# Patient Record
Sex: FEMALE | Race: WHITE | NOT HISPANIC OR LATINO | URBAN - METROPOLITAN AREA
[De-identification: names, ages, dates, MRNs, and addresses within clinical notes are randomized per-mention and may not be internally consistent; named-entity substitution may affect disease eponyms.]

---

## 2020-12-22 ENCOUNTER — OFFICE VISIT (OUTPATIENT)
Dept: ENDOCRINOLOGY | Facility: CLINIC | Age: 80
End: 2020-12-22

## 2020-12-22 VITALS
HEIGHT: 63 IN | OXYGEN SATURATION: 99 % | DIASTOLIC BLOOD PRESSURE: 60 MMHG | HEART RATE: 58 BPM | TEMPERATURE: 97.3 F | SYSTOLIC BLOOD PRESSURE: 124 MMHG | BODY MASS INDEX: 29.55 KG/M2 | WEIGHT: 166.8 LBS

## 2020-12-22 DIAGNOSIS — E11.65 UNCONTROLLED TYPE 2 DIABETES MELLITUS WITH HYPERGLYCEMIA (HCC): Primary | ICD-10-CM

## 2020-12-22 DIAGNOSIS — I10 BENIGN HYPERTENSION: ICD-10-CM

## 2020-12-22 DIAGNOSIS — I25.10 ATHEROSCLEROSIS OF NATIVE CORONARY ARTERY OF NATIVE HEART WITHOUT ANGINA PECTORIS: ICD-10-CM

## 2020-12-22 PROBLEM — E78.2 MIXED HYPERLIPIDEMIA: Status: ACTIVE | Noted: 2020-12-22

## 2020-12-22 LAB
EXPIRATION DATE: NORMAL
HBA1C MFR BLD: 7.8 %
Lab: NORMAL

## 2020-12-22 PROCEDURE — 83036 HEMOGLOBIN GLYCOSYLATED A1C: CPT | Performed by: INTERNAL MEDICINE

## 2020-12-22 PROCEDURE — 99214 OFFICE O/P EST MOD 30 MIN: CPT | Performed by: INTERNAL MEDICINE

## 2020-12-22 RX ORDER — ASPIRIN 81 MG/1
81 TABLET ORAL DAILY
COMMUNITY

## 2020-12-22 RX ORDER — EMPAGLIFLOZIN AND LINAGLIPTIN 25; 5 MG/1; MG/1
1 TABLET, FILM COATED ORAL DAILY
Start: 2020-12-22

## 2020-12-22 NOTE — PROGRESS NOTES
"     Office Note      Date: 2020  Patient Name: Martha Dunbar  MRN: [id number]  : 1940    Chief Complaint   Patient presents with   • Diabetes       History of Present Illness:   Martha Dunbar is a 80 y.o. female who presents for Diabetes type 2. Diagnosed in: . Treated in past with oral agents. Current treatments: glyxambi and metformin. Number of insulin shots per day: none. Checks blood sugar none times a day. Has low blood sugar: no. Aspirin use: Yes. Statin use: Yes. ACE-I/ARB use: Yes. Changes in health since last visit: syncope - seeing cardiologist - on monitor. Last eye exam 2019.    Subjective      Diabetic Complications:  Eyes: No  Kidneys: No  Feet: No  Heart: Yes - stent    Diet and Exercise:  Meals per day: 3  Minutes of exercise per week: 0 mins.    Review of Systems:   Review of Systems   Constitutional: Positive for fatigue.   Cardiovascular: Negative.    Gastrointestinal: Negative.    Endocrine: Negative.        The following portions of the patient's history were reviewed and updated as appropriate: allergies, current medications, past family history, past medical history, past social history, past surgical history and problem list.    Objective       Visit Vitals  /60   Pulse 58   Temp 97.3 °F (36.3 °C) (Infrared)   Ht 160 cm (63\")   Wt 75.7 kg (166 lb 12.8 oz)   SpO2 99%   BMI 29.55 kg/m²       Physical Exam:  Physical Exam  Constitutional:       Appearance: Normal appearance.   Neurological:      Mental Status: She is alert.         Labs:    HbA1c  Lab Results   Component Value Date    HGBA1C 7.8 2020       CMP  Lab Results   Component Value Date    K 4.23 2015        Lipid Panel        TSH  No results found for: TSH, FREET4     Hemoglobin A1C  Lab Results   Component Value Date    HGBA1C 7.8 2020        Microalbumin/Creatinine  No results found for: MALBCRERATIO, CREATINIURIN, MICROALBUR        Assessment / Plan      Assessment & Plan:  Problem " List Items Addressed This Visit        Cardiovascular and Mediastinum    Benign hypertension    Current Assessment & Plan     Hypertension is improving with treatment.  Continue current treatment regimen.  Blood pressure will be reassessed in 3 months.    BP okay today but BP meds have been decreased.         Relevant Medications    LISINOPRIL PO    CARVEDILOL PO    Atherosclerosis of native coronary artery of native heart without angina pectoris    Current Assessment & Plan     Continue cardiology follow up.         Relevant Medications    CARVEDILOL PO       Endocrine    Uncontrolled type 2 diabetes mellitus with hyperglycemia (CMS/[facility]) - Primary    Current Assessment & Plan     Diabetes is unchanged.   Continue current treatment regimen.  Diabetes will be reassessed in 3 months.    A1c acceptable.  She has been caring for her brother who recently passed away. She hopes to be able to exercise more now.           Relevant Medications    metFORMIN (GLUCOPHAGE) 1000 MG tablet    Empagliflozin-linaGLIPtin (Glyxambi) 25-5 MG tablet    Other Relevant Orders    POC Glycosylated Hemoglobin (Hb A1C) (Completed)    Microalbumin / Creatinine Urine Ratio - Urine, Clean Catch           Return in about 3 months (around 3/22/2021) for Recheck with A1c.    Abel Lee MD   12/22/2020

## 2020-12-22 NOTE — ASSESSMENT & PLAN NOTE
Diabetes is unchanged.   Continue current treatment regimen.  Diabetes will be reassessed in 3 months.    A1c acceptable.  She has been caring for her brother who recently passed away. She hopes to be able to exercise more now.

## 2020-12-22 NOTE — ASSESSMENT & PLAN NOTE
Hypertension is improving with treatment.  Continue current treatment regimen.  Blood pressure will be reassessed in 3 months.    BP okay today but BP meds have been decreased.

## 2020-12-23 LAB
ALBUMIN/CREAT UR: <5 MG/G CREAT (ref 0–29)
CREAT UR-MCNC: 64.9 MG/DL
MICROALBUMIN UR-MCNC: <3 UG/ML

## 2021-04-01 ENCOUNTER — OFFICE VISIT (OUTPATIENT)
Dept: ENDOCRINOLOGY | Facility: CLINIC | Age: 81
End: 2021-04-01

## 2021-04-01 VITALS
SYSTOLIC BLOOD PRESSURE: 122 MMHG | WEIGHT: 168 LBS | HEIGHT: 63 IN | TEMPERATURE: 97.3 F | BODY MASS INDEX: 29.77 KG/M2 | OXYGEN SATURATION: 97 % | DIASTOLIC BLOOD PRESSURE: 62 MMHG | HEART RATE: 72 BPM

## 2021-04-01 DIAGNOSIS — I25.10 ATHEROSCLEROSIS OF NATIVE CORONARY ARTERY OF NATIVE HEART WITHOUT ANGINA PECTORIS: ICD-10-CM

## 2021-04-01 DIAGNOSIS — E11.65 UNCONTROLLED TYPE 2 DIABETES MELLITUS WITH HYPERGLYCEMIA (HCC): Primary | ICD-10-CM

## 2021-04-01 DIAGNOSIS — E78.2 MIXED HYPERLIPIDEMIA: ICD-10-CM

## 2021-04-01 DIAGNOSIS — I10 BENIGN HYPERTENSION: ICD-10-CM

## 2021-04-01 LAB
EXPIRATION DATE: NORMAL
HBA1C MFR BLD: 8.1 %
Lab: NORMAL

## 2021-04-01 PROCEDURE — 83036 HEMOGLOBIN GLYCOSYLATED A1C: CPT | Performed by: INTERNAL MEDICINE

## 2021-04-01 PROCEDURE — 99214 OFFICE O/P EST MOD 30 MIN: CPT | Performed by: INTERNAL MEDICINE

## 2021-04-01 RX ORDER — ATORVASTATIN CALCIUM 40 MG/1
TABLET, FILM COATED ORAL DAILY
COMMUNITY
Start: 2021-03-29

## 2021-04-01 RX ORDER — CARVEDILOL 6.25 MG/1
TABLET ORAL 2 TIMES DAILY
COMMUNITY
Start: 2021-02-08

## 2021-04-01 RX ORDER — LISINOPRIL 10 MG/1
TABLET ORAL DAILY
COMMUNITY
Start: 2021-03-29

## 2021-04-01 RX ORDER — CLOPIDOGREL BISULFATE 75 MG/1
TABLET ORAL DAILY
COMMUNITY
Start: 2021-02-19

## 2021-04-01 RX ORDER — CLOBETASOL PROPIONATE 0.5 MG/G
CREAM TOPICAL AS NEEDED
COMMUNITY
Start: 2021-03-29

## 2021-04-01 RX ORDER — EZETIMIBE 10 MG/1
TABLET ORAL DAILY
COMMUNITY
Start: 2021-03-29

## 2021-04-01 NOTE — PROGRESS NOTES
"     Office Note      Date: 2021  Patient Name: Martha Dunbar  MRN: [id number]  : 1940    Chief Complaint   Patient presents with   • Follow-up       History of Present Illness:   Martha Dunbar is a 80 y.o. female who presents for Diabetes type 2. Diagnosed in: . Treated in past with oral agents. Current treatments: glyxambi and metformin. Number of insulin shots per day: none. Checks blood sugar one time a week. Has low blood sugar: no. Aspirin use: Yes. Statin use: Yes. ACE-I/ARB use: Yes. Changes in health since last visit: none. Last eye exam .    Subjective      Diabetic Complications:  Eyes: No  Kidneys: No  Feet: No  Heart: Yes - stent    Diet and Exercise:  Meals per day: 3  Minutes of exercise per week: 0 mins.    Review of Systems:   Review of Systems   Constitutional: Negative.    Cardiovascular: Negative.    Gastrointestinal: Negative.    Endocrine: Negative.        The following portions of the patient's history were reviewed and updated as appropriate: allergies, current medications, past family history, past medical history, past social history, past surgical history and problem list.    Objective       Visit Vitals  /62   Pulse 72   Temp 97.3 °F (36.3 °C) (Infrared)   Ht 160 cm (63\")   Wt 76.2 kg (168 lb)   SpO2 97%   BMI 29.76 kg/m²       Physical Exam:  Physical Exam  Constitutional:       Appearance: Normal appearance.   Neurological:      Mental Status: She is alert.         Labs:    HbA1c  Lab Results   Component Value Date    HGBA1C 8.1 2021       CMP  Lab Results   Component Value Date    K 4.23 2015        Lipid Panel        TSH  No results found for: TSH, FREET4     Hemoglobin A1C  Lab Results   Component Value Date    HGBA1C 8.1 2021        Microalbumin/Creatinine  Lab Results   Component Value Date    MALBCRERATIO <5 2020    CREATINIURIN 64.9 2020    MICROALBUR <3.0 2020           Assessment / Plan      Assessment & " Plan:  Diagnoses and all orders for this visit:    1. Uncontrolled type 2 diabetes mellitus with hyperglycemia (CMS/[facility]) (Primary)  Assessment & Plan:  Diabetes is worsening.   Continue current treatment regimen.  Diabetes will be reassessed in 3 months.    She started back to the gym about 6 weeks ago.  Work on diet/exercise for now.    Orders:  -     POC Glycosylated Hemoglobin (Hb A1C)    2. Benign hypertension  Assessment & Plan:  Hypertension is unchanged.  Continue current treatment regimen.  Blood pressure will be reassessed in 3 months.      3. Mixed hyperlipidemia  Assessment & Plan:  Continue statin and zetia.      4. Atherosclerosis of native coronary artery of native heart without angina pectoris  Assessment & Plan:  Continue ASA, statin and SGLT-2 inhibitor.        Return in about 3 months (around 7/1/2021) for Recheck with A1c.    Abel Lee MD   04/01/2021

## 2021-04-01 NOTE — ASSESSMENT & PLAN NOTE
Diabetes is worsening.   Continue current treatment regimen.  Diabetes will be reassessed in 3 months.    She started back to the gym about 6 weeks ago.  Work on diet/exercise for now.

## 2021-09-10 ENCOUNTER — MEDICATION THERAPY MANAGEMENT (OUTPATIENT)
Dept: ENDOCRINOLOGY | Facility: CLINIC | Age: 81
End: 2021-09-10

## 2021-09-10 ENCOUNTER — OFFICE VISIT (OUTPATIENT)
Dept: ENDOCRINOLOGY | Facility: CLINIC | Age: 81
End: 2021-09-10

## 2021-09-10 VITALS
HEART RATE: 57 BPM | WEIGHT: 165 LBS | SYSTOLIC BLOOD PRESSURE: 130 MMHG | DIASTOLIC BLOOD PRESSURE: 60 MMHG | BODY MASS INDEX: 29.23 KG/M2 | HEIGHT: 63 IN | OXYGEN SATURATION: 100 %

## 2021-09-10 DIAGNOSIS — E78.2 MIXED HYPERLIPIDEMIA: ICD-10-CM

## 2021-09-10 DIAGNOSIS — E11.65 UNCONTROLLED TYPE 2 DIABETES MELLITUS WITH HYPERGLYCEMIA (HCC): Primary | ICD-10-CM

## 2021-09-10 DIAGNOSIS — I10 BENIGN HYPERTENSION: ICD-10-CM

## 2021-09-10 DIAGNOSIS — I25.10 ATHEROSCLEROSIS OF NATIVE CORONARY ARTERY OF NATIVE HEART WITHOUT ANGINA PECTORIS: ICD-10-CM

## 2021-09-10 LAB
GLUCOSE BLD-MCNC: 180 MG/DL
HBA1C MFR BLD: 8.5 %

## 2021-09-10 PROCEDURE — 99214 OFFICE O/P EST MOD 30 MIN: CPT | Performed by: INTERNAL MEDICINE

## 2021-09-10 RX ORDER — FLUOCINOLONE ACETONIDE 0.25 MG/G
CREAM TOPICAL EVERY 12 HOURS SCHEDULED
COMMUNITY

## 2021-09-10 NOTE — PROGRESS NOTES
MTM-[facility] Pharmacy Visit [facility]    Martha Dunbar is a 81 y.o. female with T2DM seen by Endocrinology and assessed by the Medication Management Clinic Pharmacist at [facility]. This was an Initial MTM visit for Martha Dunbar.    Martha Dunbar was introduced to the [facility] and allergies, PMH, and medications were reviewed.    Insurance Coverage/Eligibility:  • [employer]  • Patient is Eligible for [facility]    Past Medical History:   Diagnosis Date   • Arthritis    • Benign essential hypertension    • CAD (coronary artery disease)    • Coronary arteriosclerosis    • Dependent edema    • Disorder associated with type 2 diabetes mellitus (CMS/HCC)    • Mixed hyperlipidemia    • Overweight (BMI 25.0-29.9)      Social History     Socioeconomic History   • Marital status: Unknown     Spouse name: Not on file   • Number of children: Not on file   • Years of education: Not on file   • Highest education level: Not on file   Tobacco Use   • Smoking status: Never Smoker   • Smokeless tobacco: Never Used   Vaping Use   • Vaping Use: Never used   Substance and Sexual Activity   • Alcohol use: Yes     Alcohol/week: 1.0 standard drinks     Types: 1 Glasses of wine per week   • Drug use: Never   • Sexual activity: Defer     Medication Allergies:  Darvon [propoxyphene], Floxacillin [flucloxacillin], Penicillins, Sulfa antibiotics, and Victoza [liraglutide]    Current Medication List:    Current Outpatient Medications:   •  aspirin 81 MG EC tablet, Take 81 mg by mouth Daily., Disp: , Rfl:   •  atorvastatin (LIPITOR) 40 MG tablet, Daily., Disp: , Rfl:   •  carvedilol (COREG) 6.25 MG tablet, 2 (two) times a day., Disp: , Rfl:   •  clobetasol (TEMOVATE) 0.05 % cream, As Needed., Disp: , Rfl:   •  clopidogrel (PLAVIX) 75 MG tablet, Daily., Disp: , Rfl:   •  Empagliflozin-linaGLIPtin (Glyxambi) 25-5 MG tablet, Take 1 each by mouth  Daily., Disp: , Rfl:   •  ezetimibe (ZETIA) 10 MG tablet, Daily., Disp: , Rfl:   •  fluocinolone (SYNALAR) 0.025 % cream, Apply  topically to the appropriate area as directed Every 12 (Twelve) Hours., Disp: , Rfl:   •  lisinopril (PRINIVIL,ZESTRIL) 10 MG tablet, Daily., Disp: , Rfl:   •  metFORMIN (GLUCOPHAGE) 1000 MG tablet, Take 1,000 mg by mouth 2 (two) times a day., Disp: , Rfl:     Vitals/Labs:  BP: (130)/(60) 130/60   Heart Rate:  [57] 57      There were no vitals filed for this visit.  Lab Results   Component Value Date    HGBA1C 8.1 04/01/2021     Lab Results   Component Value Date    K 4.23 09/16/2015     No results found for: CHOL, CHLPL, TRIG, HDL, LDL, LDLDIRECT  Microalbumin    Microalbumin 12/22/20   Microalbumin, Urine <3.0            Drug-Drug Interactions:   • Clopidogrel + ASA: Concurrent use increases risk of bleeding. Managed by patient's cardiologist. Endorses bruising but denies any other s/sx of bleeding. No further action warranted at this time.     Medication Assessment:   1. Aspirin - Currently Taking  2. Statin - Currently Taking  3. ACEi/ARB - Currently Taking    Medication Education on Specialty Medication:  The patient was provided medication education via verbal and written information on new and/or current target medications. Patient expressed understanding and had no further questions at this time.    Glyxambi (empagliflozin/linagliptin)   · Discussed the medication's MOA of increasing insulin synthesis, decreasing hepatic glucose production, and increased urinary excretion of glucose    · Take one tablet daily in the morning with or without food  · May cause more frequent urination especially upon starting the medication. Also discussed there may be an increased incidence of UTIs or yeast infections and to monitor for s/sx such as itching, pain/burning on urination, odor, and discharge.   • Encouraged to drink plenty of water as to not get dehydrated especially in warmer weather or  "with activity      Assessment & Plan:  1. Provider Changes This Visit: None  2. Ms. Dunbar endorsed some \"stomach upset to PharmD, unsure if related to current medication therapy. States she has an upcoming GI scope and prefers to wait until she has scope results to change any medication therapy. Encouraged pt to inform clinic of results should they impact DM medication regimen.   3. Therapy Modifications Recommended: None at This Time   4. Provided contact information for the pharmacy team and discussed [facility] services available to patient, including: monitoring of refills, prior authorizations, and copay coupon cards, as applicable, as well as curb-side pick-up or mail-order as needed.   5. Ms. Dunbar currently filling prescriptions with [facility] in [place]. States her Glyxambi frequently has to be ordered when she requests refill but otherwise no issues acquiring medications. Discussed anticipated copays through  Retail Pharm[facility], similar to patient's current pharmacy prices. As a result, pt not interested in converting at this time.   6. PharmD Follow Up: Plan to f/u with pt at next provider appointment or sooner if contacted by patient.      Juliet Hull, [place]  9/10/2021  12:49 EDT    "

## 2021-09-10 NOTE — PROGRESS NOTES
"     Office Note      Date: 09/10/2021  Patient Name: Martha Dunbar  MRN: [id number]  : 1940    Chief Complaint   Patient presents with   • Diabetes       History of Present Illness:   Martha Dunbar is a 81 y.o. female who presents for Diabetes type 2. Diagnosed in: . Treated in past with oral agents. Current treatments: glyxambi and metformin. Number of insulin shots per day: none. Checks blood sugar one time a week. Has low blood sugar: no. Aspirin use: Yes. Statin use: Yes. ACE-I/ARB use: Yes. Changes in health since last visit: none. Last eye exam 10/2020.    Subjective      Diabetic Complications:  Eyes: No  Kidneys: No  Feet: No  Heart: Yes - stent    Diet and Exercise:  Meals per day: 3  Minutes of exercise per week: 0 mins.    Review of Systems:   Review of Systems   Constitutional: Negative.    Cardiovascular: Negative.    Gastrointestinal: Negative.    Endocrine: Negative.        The following portions of the patient's history were reviewed and updated as appropriate: allergies, current medications, past family history, past medical history, past social history, past surgical history and problem list.    Objective       Visit Vitals  /60 (BP Location: Left arm, Patient Position: Sitting, Cuff Size: Adult)   Pulse 57   Ht 160 cm (63\")   Wt 74.8 kg (165 lb)   SpO2 100%   BMI 29.23 kg/m²       Physical Exam:  Physical Exam  Constitutional:       Appearance: Normal appearance.   Cardiovascular:      Pulses:           Dorsalis pedis pulses are 2+ on the right side and 2+ on the left side.        Posterior tibial pulses are 2+ on the right side and 2+ on the left side.   Musculoskeletal:      Right foot: Deformity present.      Left foot: Deformity present.   Feet:      Right foot:      Protective Sensation: 5 sites tested. 5 sites sensed.      Skin integrity: Skin integrity normal.      Toenail Condition: Right toenails are normal.      Left foot:      Protective Sensation: 5 sites tested. " 5 sites sensed.      Skin integrity: Skin integrity normal.      Toenail Condition: Left toenails are normal.      Comments: Hammer toes - 2nd toes bilaterally  Neurological:      Mental Status: She is alert.         Labs:    HbA1c  Lab Results   Component Value Date    HGBA1C 8.1 04/01/2021       CMP  Lab Results   Component Value Date    K 4.23 09/16/2015        Lipid Panel        TSH  No results found for: TSH, FREET4     Hemoglobin A1C  Lab Results   Component Value Date    HGBA1C 8.1 04/01/2021        Microalbumin/Creatinine  Lab Results   Component Value Date    MALBCRERATIO <5 12/22/2020    CREATINIURIN 64.9 12/22/2020    MICROALBUR <3.0 12/22/2020           Assessment / Plan      Assessment & Plan:  Diagnoses and all orders for this visit:    1. Uncontrolled type 2 diabetes mellitus with hyperglycemia (CMS/[facility]) (Primary)  Assessment & Plan:  Trying to track down recent labs.  Continue glyxambi and metformin.      2. Benign hypertension  Assessment & Plan:  Hypertension is unchanged.  Continue current treatment regimen.  Blood pressure will be reassessed at the next regular appointment.      3. Mixed hyperlipidemia  Assessment & Plan:  Continue statin and ezetimibe.      4. Atherosclerosis of native coronary artery of native heart without angina pectoris  Assessment & Plan:  Continue ASA, statin, and SGLT-2 inhibitor.        Return in about 3 months (around 12/10/2021) for Recheck with A1c, microalbumin.    Abel Lee MD   09/10/2021

## 2021-12-20 ENCOUNTER — DOCUMENTATION (OUTPATIENT)
Dept: ENDOCRINOLOGY | Facility: CLINIC | Age: 81
End: 2021-12-20

## 2021-12-20 NOTE — PROGRESS NOTES
Patient declined filling specialty medication at [facility] and/or enrollment in the Patient Management Program.    [employer]to[employer]- $60/ 90 days  Patient loyal to their pharmacy    Denae Lundy CPhT  12/20/2021  15:29 EST

## 2022-01-24 ENCOUNTER — LAB (OUTPATIENT)
Dept: LAB | Facility: HOSPITAL | Age: 82
End: 2022-01-24

## 2022-01-24 ENCOUNTER — OFFICE VISIT (OUTPATIENT)
Dept: ENDOCRINOLOGY | Facility: CLINIC | Age: 82
End: 2022-01-24

## 2022-01-24 VITALS
BODY MASS INDEX: 29.41 KG/M2 | HEIGHT: 63 IN | HEART RATE: 60 BPM | SYSTOLIC BLOOD PRESSURE: 122 MMHG | DIASTOLIC BLOOD PRESSURE: 66 MMHG | WEIGHT: 166 LBS | OXYGEN SATURATION: 97 %

## 2022-01-24 DIAGNOSIS — E11.65 UNCONTROLLED TYPE 2 DIABETES MELLITUS WITH HYPERGLYCEMIA: ICD-10-CM

## 2022-01-24 DIAGNOSIS — E78.2 MIXED HYPERLIPIDEMIA: ICD-10-CM

## 2022-01-24 DIAGNOSIS — I10 BENIGN HYPERTENSION: ICD-10-CM

## 2022-01-24 DIAGNOSIS — I25.10 ATHEROSCLEROSIS OF NATIVE CORONARY ARTERY OF NATIVE HEART WITHOUT ANGINA PECTORIS: ICD-10-CM

## 2022-01-24 DIAGNOSIS — E11.65 UNCONTROLLED TYPE 2 DIABETES MELLITUS WITH HYPERGLYCEMIA: Primary | ICD-10-CM

## 2022-01-24 LAB
EXPIRATION DATE: ABNORMAL
GLUCOSE BLDC GLUCOMTR-MCNC: 159 MG/DL (ref 70–130)
HBA1C MFR BLD: 8.3 %
Lab: ABNORMAL

## 2022-01-24 PROCEDURE — 99214 OFFICE O/P EST MOD 30 MIN: CPT | Performed by: INTERNAL MEDICINE

## 2022-01-24 PROCEDURE — 82570 ASSAY OF URINE CREATININE: CPT

## 2022-01-24 PROCEDURE — 82043 UR ALBUMIN QUANTITATIVE: CPT

## 2022-01-24 PROCEDURE — 82947 ASSAY GLUCOSE BLOOD QUANT: CPT | Performed by: INTERNAL MEDICINE

## 2022-01-24 NOTE — PROGRESS NOTES
"     Office Note      Date: 2022  Patient Name: Martha Dunbar  MRN: [id number]  : 1940    Chief Complaint   Patient presents with   • Diabetes     type I       History of Present Illness:   Martha Dunbar is a 81 y.o. female who presents for Diabetes type 2. Diagnosed in: . Treated in past with oral agents. Current treatments: glyxambi and metformin. Number of insulin shots per day: none. Checks blood sugar one time a week. Has low blood sugar: no. Aspirin use: Yes. Statin use: Yes. ACE-I/ARB use: Yes. Changes in health since last visit: none. Last eye exam 10/2020.    Subjective      Diabetic Complications:  Eyes: No  Kidneys: No  Feet: No  Heart: Yes - stent    Diet and Exercise:  Meals per day: 3  Minutes of exercise per week: 0 mins.    Review of Systems:   Review of Systems   Constitutional: Negative.    Cardiovascular: Negative.    Gastrointestinal: Negative.    Endocrine: Negative.        The following portions of the patient's history were reviewed and updated as appropriate: allergies, current medications, past family history, past medical history, past social history, past surgical history and problem list.    Objective       Visit Vitals  /66 (BP Location: Right arm, Patient Position: Sitting, Cuff Size: Adult)   Pulse 60   Ht 160 cm (63\")   Wt 75.3 kg (166 lb)   SpO2 97%   BMI 29.41 kg/m²       Physical Exam:  Physical Exam  Constitutional:       Appearance: Normal appearance.   Neurological:      Mental Status: She is alert.         Labs:    HbA1c  Lab Results   Component Value Date    HGBA1C 8.3 12/15/2021       CMP  Lab Results   Component Value Date    GLUCOSE 180 2021    K 4.23 2015        Lipid Panel        TSH  No results found for: TSH, FREET4     Hemoglobin A1C  Lab Results   Component Value Date    HGBA1C 8.3 12/15/2021        Microalbumin/Creatinine  Lab Results   Component Value Date    MALBCRERATIO <5 2020    CREATINIURIN 64.9 2020    " MICROALBUR <3.0 12/22/2020           Assessment / Plan      Assessment & Plan:  Diagnoses and all orders for this visit:    1. Uncontrolled type 2 diabetes mellitus with hyperglycemia (HCC) (Primary)  Assessment & Plan:  Diabetes is improving with treatment.  A1c slightly better at 8.3%.  Continue current treatment regimen.  She plans to work more on diet/exercise.  Heading to [place] soon and plans to be more active.  Diabetes will be reassessed in 3 months.    Orders:  -     POC Glucose, Blood  -     Cancel: POC Glycosylated Hemoglobin (Hb A1C)  -     Microalbumin / Creatinine Urine Ratio - Urine, Clean Catch; Future    2. Benign hypertension  Assessment & Plan:  Hypertension is unchanged.  Continue current treatment regimen.  Blood pressure will be reassessed at the next regular appointment.      3. Mixed hyperlipidemia  Assessment & Plan:  Continue statin.      4. Atherosclerosis of native coronary artery of native heart without angina pectoris  Assessment & Plan:  Continue ASA, statin and SGLT-2 inhibitor.        Return in about 3 months (around 4/24/2022) for Recheck with A1c, CMP.    Abel Lee MD   01/24/2022

## 2022-01-24 NOTE — ASSESSMENT & PLAN NOTE
Diabetes is improving with treatment.  A1c slightly better at 8.3%.  Continue current treatment regimen.  She plans to work more on diet/exercise.  Heading to [place] soon and plans to be more active.  Diabetes will be reassessed in 3 months.

## 2022-01-25 LAB
ALBUMIN UR-MCNC: 4.5 MG/DL
CREAT UR-MCNC: 62.9 MG/DL
MICROALBUMIN/CREAT UR: 71.5 MG/G

## 2022-05-17 ENCOUNTER — OFFICE VISIT (OUTPATIENT)
Dept: ENDOCRINOLOGY | Facility: CLINIC | Age: 82
End: 2022-05-17

## 2022-05-17 ENCOUNTER — LAB (OUTPATIENT)
Dept: LAB | Facility: HOSPITAL | Age: 82
End: 2022-05-17

## 2022-05-17 VITALS
HEIGHT: 63 IN | DIASTOLIC BLOOD PRESSURE: 64 MMHG | SYSTOLIC BLOOD PRESSURE: 130 MMHG | OXYGEN SATURATION: 96 % | WEIGHT: 168 LBS | BODY MASS INDEX: 29.77 KG/M2 | HEART RATE: 62 BPM

## 2022-05-17 DIAGNOSIS — E11.65 UNCONTROLLED TYPE 2 DIABETES MELLITUS WITH HYPERGLYCEMIA: Primary | ICD-10-CM

## 2022-05-17 DIAGNOSIS — E11.65 UNCONTROLLED TYPE 2 DIABETES MELLITUS WITH HYPERGLYCEMIA: ICD-10-CM

## 2022-05-17 DIAGNOSIS — I10 BENIGN HYPERTENSION: ICD-10-CM

## 2022-05-17 DIAGNOSIS — E78.2 MIXED HYPERLIPIDEMIA: ICD-10-CM

## 2022-05-17 DIAGNOSIS — I25.10 ATHEROSCLEROSIS OF NATIVE CORONARY ARTERY OF NATIVE HEART WITHOUT ANGINA PECTORIS: ICD-10-CM

## 2022-05-17 LAB
EXPIRATION DATE: ABNORMAL
EXPIRATION DATE: NORMAL
GLUCOSE BLDC GLUCOMTR-MCNC: 146 MG/DL (ref 70–130)
HBA1C MFR BLD: 7.7 %
Lab: ABNORMAL
Lab: NORMAL

## 2022-05-17 PROCEDURE — 83036 HEMOGLOBIN GLYCOSYLATED A1C: CPT | Performed by: INTERNAL MEDICINE

## 2022-05-17 PROCEDURE — 82947 ASSAY GLUCOSE BLOOD QUANT: CPT | Performed by: INTERNAL MEDICINE

## 2022-05-17 PROCEDURE — 99214 OFFICE O/P EST MOD 30 MIN: CPT | Performed by: INTERNAL MEDICINE

## 2022-05-17 PROCEDURE — 3051F HG A1C>EQUAL 7.0%<8.0%: CPT | Performed by: INTERNAL MEDICINE

## 2022-05-17 NOTE — PROGRESS NOTES
"     Office Note      Date: 2022  Patient Name: Martha Dunbar  MRN: [id number]  : 1940    Chief Complaint   Patient presents with   • Diabetes     Type II   • Hyperlipidemia   • Hypertension       History of Present Illness:   Martha Dunbar is a 82 y.o. female who presents for Diabetes type 2. Diagnosed in: . Treated in past with oral agents. Current treatments: glyxambi and metformin. Number of insulin shots per day: none. Checks blood sugar one time a week. Has low blood sugar: no. Aspirin use: Yes. Statin use: Yes. ACE-I/ARB use: Yes. Changes in health since last visit: none. Last eye exam 2022.    She thinks she might have a UTI.    She had gastric emptying study done 2 months ago.  This did show some delayed gastric emptying.      Subjective      Diabetic Complications:  Eyes: No  Kidneys: No  Feet: No  Heart: Yes - stent    Diet and Exercise:  Meals per day: 3  Minutes of exercise per week: 0 mins.    Review of Systems:   Review of Systems   Constitutional: Negative.    Cardiovascular: Negative.    Gastrointestinal: Negative.    Endocrine: Negative.        The following portions of the patient's history were reviewed and updated as appropriate: allergies, current medications, past family history, past medical history, past social history, past surgical history and problem list.    Objective       Visit Vitals  /64 (BP Location: Left arm, Patient Position: Sitting, Cuff Size: Adult)   Pulse 62   Ht 160 cm (63\")   Wt 76.2 kg (168 lb)   SpO2 96%   BMI 29.76 kg/m²       Physical Exam:  Physical Exam  Constitutional:       Appearance: Normal appearance.   Neurological:      Mental Status: She is alert.         Labs:    HbA1c  Lab Results   Component Value Date    HGBA1C 7.7 2022       CMP  Lab Results   Component Value Date    GLUCOSE 180 2021    K 4.23 2015        Lipid Panel        TSH  No results found for: TSH, FREET4     Hemoglobin A1C  Lab Results   Component " Value Date    HGBA1C 7.7 05/17/2022        Microalbumin/Creatinine  Lab Results   Component Value Date    MALBCRERATIO 71.5 01/24/2022    CREATINIURIN 64.9 12/22/2020    MICROALBUR 4.5 01/24/2022           Assessment / Plan      Assessment & Plan:  Diagnoses and all orders for this visit:    1. Uncontrolled type 2 diabetes mellitus with hyperglycemia (HCC) (Primary)  Assessment & Plan:  Diabetes is improving with lifestyle modifications.   Continue current treatment regimen.  Diabetes will be reassessed in 3 months.    Check u/a today.  She thinks she might have UTI.  She has some abx allergies but has tolerated cipro in the past.      Orders:  -     POC Glucose, Blood  -     POC Glycosylated Hemoglobin (Hb A1C)  -     Comprehensive Metabolic Panel; Future  -     Urinalysis With Culture If Indicated -; Future    2. Benign hypertension  Assessment & Plan:  Hypertension is unchanged.  Continue current treatment regimen.  Blood pressure will be reassessed at the next regular appointment.      3. Mixed hyperlipidemia  Assessment & Plan:  Continue statin.      4. Atherosclerosis of native coronary artery of native heart without angina pectoris  Assessment & Plan:  Continue ASA, statin, ezetimibe and SGLT-2 inhibitor.        Return in about 3 months (around 8/17/2022) for Recheck with A1c, CMP, lipids, TSH, microalbumin, foot exam.    Abel Lee MD   05/17/2022

## 2022-05-17 NOTE — ASSESSMENT & PLAN NOTE
Diabetes is improving with lifestyle modifications.   Continue current treatment regimen.  Diabetes will be reassessed in 3 months.    Check u/a today.  She thinks she might have UTI.  She has some abx allergies but has tolerated cipro in the past.

## 2022-05-18 LAB
ALBUMIN SERPL-MCNC: 3.7 G/DL (ref 3.5–5.2)
ALBUMIN/GLOB SERPL: 1.5 G/DL
ALP SERPL-CCNC: 73 U/L (ref 39–117)
ALT SERPL-CCNC: 24 U/L (ref 1–33)
AST SERPL-CCNC: 25 U/L (ref 1–32)
BILIRUB SERPL-MCNC: 0.4 MG/DL (ref 0–1.2)
BUN SERPL-MCNC: 12 MG/DL (ref 8–23)
BUN/CREAT SERPL: 12.9 (ref 7–25)
CALCIUM SERPL-MCNC: 9.6 MG/DL (ref 8.6–10.5)
CHLORIDE SERPL-SCNC: 105 MMOL/L (ref 98–107)
CO2 SERPL-SCNC: 21.2 MMOL/L (ref 22–29)
CREAT SERPL-MCNC: 0.93 MG/DL (ref 0.57–1)
EGFRCR SERPLBLD CKD-EPI 2021: 61.5 ML/MIN/1.73
GLOBULIN SER CALC-MCNC: 2.5 GM/DL
GLUCOSE SERPL-MCNC: 121 MG/DL (ref 65–99)
POTASSIUM SERPL-SCNC: 4.9 MMOL/L (ref 3.5–5.2)
PROT SERPL-MCNC: 6.2 G/DL (ref 6–8.5)
SODIUM SERPL-SCNC: 141 MMOL/L (ref 136–145)

## 2022-05-21 LAB
APPEARANCE UR: CLEAR
BACTERIA #/AREA URNS HPF: ABNORMAL /[HPF]
BACTERIA UR CULT: ABNORMAL
BILIRUB UR QL STRIP: NEGATIVE
CASTS URNS QL MICRO: ABNORMAL /LPF
COLOR UR: YELLOW
EPI CELLS #/AREA URNS HPF: ABNORMAL /HPF (ref 0–10)
GLUCOSE UR QL STRIP: ABNORMAL
HGB UR QL STRIP: NEGATIVE
KETONES UR QL STRIP: NEGATIVE
LEUKOCYTE ESTERASE UR QL STRIP: ABNORMAL
MICRO URNS: ABNORMAL
NITRITE UR QL STRIP: NEGATIVE
OTHER ANTIBIOTIC SUSC ISLT: ABNORMAL
PH UR STRIP: 5 [PH] (ref 5–7.5)
PROT UR QL STRIP: NEGATIVE
RBC #/AREA URNS HPF: ABNORMAL /HPF (ref 0–2)
SP GR UR STRIP: >=1.03 (ref 1–1.03)
URINALYSIS REFLEX: ABNORMAL
UROBILINOGEN UR STRIP-MCNC: 0.2 MG/DL (ref 0.2–1)
WBC #/AREA URNS HPF: ABNORMAL /HPF (ref 0–5)

## 2022-05-22 RX ORDER — CIPROFLOXACIN 500 MG/1
500 TABLET, FILM COATED ORAL 2 TIMES DAILY
Qty: 14 TABLET | Refills: 0 | Status: SHIPPED | OUTPATIENT
Start: 2022-05-22 | End: 2023-01-24

## 2023-01-24 ENCOUNTER — OFFICE VISIT (OUTPATIENT)
Dept: ENDOCRINOLOGY | Facility: CLINIC | Age: 83
End: 2023-01-24
Payer: MEDICARE

## 2023-01-24 VITALS
BODY MASS INDEX: 28.53 KG/M2 | DIASTOLIC BLOOD PRESSURE: 68 MMHG | OXYGEN SATURATION: 96 % | HEIGHT: 63 IN | SYSTOLIC BLOOD PRESSURE: 128 MMHG | WEIGHT: 161 LBS | HEART RATE: 58 BPM

## 2023-01-24 DIAGNOSIS — E11.65 UNCONTROLLED TYPE 2 DIABETES MELLITUS WITH HYPERGLYCEMIA: Primary | ICD-10-CM

## 2023-01-24 DIAGNOSIS — I25.10 ATHEROSCLEROSIS OF NATIVE CORONARY ARTERY OF NATIVE HEART WITHOUT ANGINA PECTORIS: ICD-10-CM

## 2023-01-24 DIAGNOSIS — E78.2 MIXED HYPERLIPIDEMIA: ICD-10-CM

## 2023-01-24 DIAGNOSIS — I10 BENIGN HYPERTENSION: ICD-10-CM

## 2023-01-24 LAB
EXPIRATION DATE: ABNORMAL
EXPIRATION DATE: NORMAL
GLUCOSE BLDC GLUCOMTR-MCNC: 163 MG/DL (ref 70–130)
HBA1C MFR BLD: 7.7 %
Lab: ABNORMAL
Lab: NORMAL

## 2023-01-24 PROCEDURE — 82947 ASSAY GLUCOSE BLOOD QUANT: CPT | Performed by: INTERNAL MEDICINE

## 2023-01-24 PROCEDURE — 99214 OFFICE O/P EST MOD 30 MIN: CPT | Performed by: INTERNAL MEDICINE

## 2023-01-24 PROCEDURE — 83036 HEMOGLOBIN GLYCOSYLATED A1C: CPT | Performed by: INTERNAL MEDICINE

## 2023-01-24 PROCEDURE — 3051F HG A1C>EQUAL 7.0%<8.0%: CPT | Performed by: INTERNAL MEDICINE

## 2023-01-24 NOTE — ASSESSMENT & PLAN NOTE
Diabetes is unchanged.  A1c okay at 7.7%.  Continue current treatment regimen.  Diabetes will be reassessed in 3 months.

## 2023-01-24 NOTE — PROGRESS NOTES
"     Office Note      Date: 2023  Patient Name: Martha Dunbar  MRN: [id number]  : 1940    Chief Complaint   Patient presents with   • Diabetes       History of Present Illness:   Martha Dunbar is a 82 y.o. female who presents for Diabetes type 2. Diagnosed in: . Treated in past with oral agents. Current treatments: glyxambi and metformin. Number of insulin shots per day: none. Checks blood sugar one time a week. Has low blood sugar: no. Aspirin use: Yes. Statin use: Yes. ACE-I/ARB use: Yes. Changes in health since last visit: none. Last eye exam 2023.    Subjective      Diabetic Complications:  Eyes: No  Kidneys: No  Feet: No  Heart: Yes - stent    Diet and Exercise:  Meals per day: 3  Minutes of exercise per week: 0 mins.    Review of Systems:   Review of Systems   Constitutional: Negative.    Cardiovascular: Negative.    Gastrointestinal: Negative.    Endocrine: Negative.        The following portions of the patient's history were reviewed and updated as appropriate: allergies, current medications, past family history, past medical history, past social history, past surgical history and problem list.    Objective       Visit Vitals  /68   Pulse 58   Ht 160 cm (63\")   Wt 73 kg (161 lb)   SpO2 96%   BMI 28.52 kg/m²       Physical Exam:  Physical Exam  Constitutional:       Appearance: Normal appearance.   Cardiovascular:      Pulses:           Dorsalis pedis pulses are 2+ on the right side and 2+ on the left side.        Posterior tibial pulses are 2+ on the right side and 2+ on the left side.   Musculoskeletal:      Right foot: Bunion present.      Left foot: Bunion present.   Feet:      Right foot:      Protective Sensation: 5 sites tested. 5 sites sensed.      Skin integrity: Dry skin present.      Toenail Condition: Right toenails are abnormally thick.      Left foot:      Protective Sensation: 5 sites tested. 5 sites sensed.      Skin integrity: Dry skin present.      Toenail " Condition: Left toenails are abnormally thick.   Neurological:      Mental Status: She is alert.         Labs:    HbA1c  Lab Results   Component Value Date    HGBA1C 7.7 01/24/2023       CMP  Lab Results   Component Value Date    GLUCOSE 121 (H) 05/17/2022    BUN 14 07/25/2022    CREATININE 0.85 07/25/2022    EGFRIFNONA >60 07/25/2022    EGFRIFAFRI >60 07/25/2022    BCR 16 07/25/2022    K 4.4 07/25/2022    CO2 24 07/25/2022    CALCIUM 9.4 07/25/2022    PROTENTOTREF 6.2 05/17/2022    LABIL2 1.5 05/17/2022    AST 14 07/25/2022    ALT 19 07/25/2022        Lipid Panel  Lab Results   Component Value Date    CHLPL 93 07/25/2022    HDL 46 (L) 07/25/2022    LDL 28.4 07/25/2022    TRIG 93 07/25/2022        TSH  No results found for: TSH, FREET4     Hemoglobin A1C  Lab Results   Component Value Date    HGBA1C 7.7 01/24/2023        Microalbumin/Creatinine  Lab Results   Component Value Date    MALBCRERATIO 71.5 01/24/2022    CREATINIURIN 64.9 12/22/2020    MICROALBUR <1.2 07/25/2022           Assessment / Plan      Assessment & Plan:  Diagnoses and all orders for this visit:    1. Uncontrolled type 2 diabetes mellitus with hyperglycemia (HCC) (Primary)  Assessment & Plan:  Diabetes is unchanged.  A1c okay at 7.7%.  Continue current treatment regimen.  Diabetes will be reassessed in 3 months.    Orders:  -     POC Glucose, Blood  -     POC Glycosylated Hemoglobin (Hb A1C)    2. Benign hypertension  Assessment & Plan:  Hypertension is unchanged.  Continue current treatment regimen.  Blood pressure will be reassessed at the next regular appointment.      3. Mixed hyperlipidemia  Assessment & Plan:  Continue statin.  Lipids okay 6 months ago.      4. Atherosclerosis of native coronary artery of native heart without angina pectoris  Assessment & Plan:  Continue ASA, statin and SGLT-2 inhibitor.      Current Outpatient Medications   Medication Instructions   • aspirin 81 mg, Oral, Daily   • atorvastatin (LIPITOR) 40 MG tablet Daily    • carvedilol (COREG) 6.25 MG tablet 2 times daily   • clobetasol (TEMOVATE) 0.05 % cream As Needed   • clopidogrel (PLAVIX) 75 MG tablet Daily   • Empagliflozin-linaGLIPtin (Glyxambi) 25-5 MG tablet 1 each, Oral, Daily   • ezetimibe (ZETIA) 10 MG tablet Daily   • fluocinolone (SYNALAR) 0.025 % cream Topical, Every 12 Hours Scheduled   • lisinopril (PRINIVIL,ZESTRIL) 10 MG tablet Daily   • metFORMIN (GLUCOPHAGE) 1,000 mg, Oral, 2 times daily      Return in about 3 months (around 4/24/2023) for Recheck with A1c.    Abel Lee MD   01/24/2023

## 2023-09-26 ENCOUNTER — OFFICE VISIT (OUTPATIENT)
Dept: ENDOCRINOLOGY | Facility: CLINIC | Age: 83
End: 2023-09-26
Payer: MEDICARE

## 2023-09-26 VITALS
HEIGHT: 63 IN | BODY MASS INDEX: 28 KG/M2 | OXYGEN SATURATION: 96 % | DIASTOLIC BLOOD PRESSURE: 62 MMHG | SYSTOLIC BLOOD PRESSURE: 120 MMHG | HEART RATE: 83 BPM | WEIGHT: 158 LBS

## 2023-09-26 DIAGNOSIS — I10 BENIGN HYPERTENSION: ICD-10-CM

## 2023-09-26 DIAGNOSIS — E11.65 TYPE 2 DIABETES MELLITUS WITH HYPERGLYCEMIA, WITHOUT LONG-TERM CURRENT USE OF INSULIN: Primary | ICD-10-CM

## 2023-09-26 LAB
EXPIRATION DATE: ABNORMAL
EXPIRATION DATE: NORMAL
GLUCOSE BLDC GLUCOMTR-MCNC: 141 MG/DL (ref 70–130)
HBA1C MFR BLD: 7.4 %
Lab: ABNORMAL
Lab: NORMAL

## 2023-09-26 PROCEDURE — 1160F RVW MEDS BY RX/DR IN RCRD: CPT | Performed by: PHYSICIAN ASSISTANT

## 2023-09-26 PROCEDURE — 82947 ASSAY GLUCOSE BLOOD QUANT: CPT | Performed by: PHYSICIAN ASSISTANT

## 2023-09-26 PROCEDURE — 1159F MED LIST DOCD IN RCRD: CPT | Performed by: PHYSICIAN ASSISTANT

## 2023-09-26 PROCEDURE — 3078F DIAST BP <80 MM HG: CPT | Performed by: PHYSICIAN ASSISTANT

## 2023-09-26 PROCEDURE — 3074F SYST BP LT 130 MM HG: CPT | Performed by: PHYSICIAN ASSISTANT

## 2023-09-26 PROCEDURE — 83036 HEMOGLOBIN GLYCOSYLATED A1C: CPT | Performed by: PHYSICIAN ASSISTANT

## 2023-09-26 PROCEDURE — 99214 OFFICE O/P EST MOD 30 MIN: CPT | Performed by: PHYSICIAN ASSISTANT

## 2023-09-26 PROCEDURE — 3051F HG A1C>EQUAL 7.0%<8.0%: CPT | Performed by: PHYSICIAN ASSISTANT

## 2023-09-26 NOTE — PROGRESS NOTES
"     Office Note      Date: 2023  Patient Name: Martha Dunbar  MRN: [id number]  : 1940    Chief Complaint   Patient presents with    Diabetes       History of Present Illness:   Martha Dunbar is a 83 y.o. female who presents for follow-up for type 2 diabetes diagnosed in .  She remains on metformin 1000 mg twice a day and Glyxambi 25/5 mg daily.  She reports overall things seem to be going well.  She is asking about her blood thinner today.  She notes increased bruising on her arms.  She is up-to-date on her eye exam she goes annually in January.  She reports she has her wellness check with Dr. Lozano in January before she goes to [place] and will have labs completed at this time.      Subjective     Review of Systems:   Review of Systems   Constitutional: Negative.    Cardiovascular: Negative.    Gastrointestinal: Negative.    Endocrine: Negative.    Neurological: Negative.      The following portions of the patient's history were reviewed and updated as appropriate: allergies, current medications, past family history, past medical history, past social history, past surgical history, and problem list.    Objective     Vitals:    23 1011   BP: 120/62   Pulse: 83   SpO2: 96%   Weight: 71.7 kg (158 lb)   Height: 160 cm (63\")     Body mass index is 27.99 kg/m².    Physical Exam  Vitals reviewed.   Constitutional:       General: She is not in acute distress.     Appearance: Normal appearance.   Musculoskeletal:      Right foot: No deformity.      Left foot: No deformity.   Neurological:      Mental Status: She is alert.       HEMOGLOBIN A1C  Lab Results   Component Value Date    HGBA1C 7.4 2023       GLUCOSE  Glucose   Date Value Ref Range Status   2023 141 (A) 70 - 130 mg/dL Final           Assessment / Plan      Assessment & Plan:  1. Type 2 diabetes mellitus with hyperglycemia, without long-term current use of insulin  Her hemoglobin A1c today is stable and acceptable at 7.4%. "  For now she will continue Glyxambi and metformin.  Her weight is down 1 pound since her appointment in June.  I encouraged healthy eating habits and physical activity as tolerated.  I encouraged her to discuss her blood thinner dosing with her cardiologist.  She will have a copy of her labs sent here for review when she has these completed in January at her physical.  - POC Glucose, Blood  - POC Glycosylated Hemoglobin (Hb A1C)    2. Benign hypertension  Her blood pressure is okay today.  She will continue her current medications.      Return in about 4 months (around 1/26/2024) for Recheck typically sees Dr. Abel Lee.     This note was dictated using Dragon voice recognition.    Ariadne Chandler PA-C  09/26/2023

## 2024-09-19 ENCOUNTER — OFFICE VISIT (OUTPATIENT)
Dept: ENDOCRINOLOGY | Facility: CLINIC | Age: 84
End: 2024-09-19
Payer: MEDICARE

## 2024-09-19 VITALS
DIASTOLIC BLOOD PRESSURE: 74 MMHG | SYSTOLIC BLOOD PRESSURE: 120 MMHG | WEIGHT: 157 LBS | HEART RATE: 77 BPM | HEIGHT: 63 IN | BODY MASS INDEX: 27.82 KG/M2 | OXYGEN SATURATION: 99 %

## 2024-09-19 DIAGNOSIS — E11.65 TYPE 2 DIABETES MELLITUS WITH HYPERGLYCEMIA, WITHOUT LONG-TERM CURRENT USE OF INSULIN: Primary | ICD-10-CM

## 2024-09-19 DIAGNOSIS — I10 BENIGN HYPERTENSION: ICD-10-CM

## 2024-09-19 DIAGNOSIS — E78.2 MIXED HYPERLIPIDEMIA: ICD-10-CM

## 2024-09-19 DIAGNOSIS — I25.10 ATHEROSCLEROSIS OF NATIVE CORONARY ARTERY OF NATIVE HEART WITHOUT ANGINA PECTORIS: ICD-10-CM

## 2024-09-19 LAB
EXPIRATION DATE: ABNORMAL
EXPIRATION DATE: NORMAL
GLUCOSE BLDC GLUCOMTR-MCNC: 122 MG/DL (ref 70–130)
HBA1C MFR BLD: 7 % (ref 4.5–5.7)
Lab: ABNORMAL
Lab: NORMAL

## 2024-09-19 PROCEDURE — 3078F DIAST BP <80 MM HG: CPT | Performed by: INTERNAL MEDICINE

## 2024-09-19 PROCEDURE — 1159F MED LIST DOCD IN RCRD: CPT | Performed by: INTERNAL MEDICINE

## 2024-09-19 PROCEDURE — 1160F RVW MEDS BY RX/DR IN RCRD: CPT | Performed by: INTERNAL MEDICINE

## 2024-09-19 PROCEDURE — 83036 HEMOGLOBIN GLYCOSYLATED A1C: CPT | Performed by: INTERNAL MEDICINE

## 2024-09-19 PROCEDURE — 99214 OFFICE O/P EST MOD 30 MIN: CPT | Performed by: INTERNAL MEDICINE

## 2024-09-19 PROCEDURE — 3074F SYST BP LT 130 MM HG: CPT | Performed by: INTERNAL MEDICINE

## 2024-09-19 PROCEDURE — 82947 ASSAY GLUCOSE BLOOD QUANT: CPT | Performed by: INTERNAL MEDICINE

## 2024-09-19 PROCEDURE — 3051F HG A1C>EQUAL 7.0%<8.0%: CPT | Performed by: INTERNAL MEDICINE

## 2024-09-19 RX ORDER — MUPIROCIN 20 MG/G
1 OINTMENT TOPICAL 3 TIMES DAILY
Qty: 15 G | Refills: 1 | Status: SHIPPED | OUTPATIENT
Start: 2024-09-19

## 2025-03-31 ENCOUNTER — OFFICE VISIT (OUTPATIENT)
Dept: ENDOCRINOLOGY | Facility: CLINIC | Age: 85
End: 2025-03-31
Payer: MEDICARE

## 2025-03-31 VITALS
WEIGHT: 154.8 LBS | SYSTOLIC BLOOD PRESSURE: 122 MMHG | HEART RATE: 54 BPM | OXYGEN SATURATION: 96 % | BODY MASS INDEX: 27.43 KG/M2 | DIASTOLIC BLOOD PRESSURE: 68 MMHG | HEIGHT: 63 IN

## 2025-03-31 DIAGNOSIS — I10 BENIGN HYPERTENSION: ICD-10-CM

## 2025-03-31 DIAGNOSIS — E78.2 MIXED HYPERLIPIDEMIA: ICD-10-CM

## 2025-03-31 DIAGNOSIS — E11.65 TYPE 2 DIABETES MELLITUS WITH HYPERGLYCEMIA, WITHOUT LONG-TERM CURRENT USE OF INSULIN: Primary | ICD-10-CM

## 2025-03-31 DIAGNOSIS — I25.10 ATHEROSCLEROSIS OF NATIVE CORONARY ARTERY OF NATIVE HEART WITHOUT ANGINA PECTORIS: ICD-10-CM

## 2025-03-31 LAB
EXPIRATION DATE: ABNORMAL
EXPIRATION DATE: ABNORMAL
GLUCOSE BLDC GLUCOMTR-MCNC: 143 MG/DL (ref 70–130)
HBA1C MFR BLD: 7.6 % (ref 4.5–5.7)
Lab: ABNORMAL
Lab: ABNORMAL

## 2025-03-31 PROCEDURE — 3078F DIAST BP <80 MM HG: CPT | Performed by: INTERNAL MEDICINE

## 2025-03-31 PROCEDURE — 3051F HG A1C>EQUAL 7.0%<8.0%: CPT | Performed by: INTERNAL MEDICINE

## 2025-03-31 PROCEDURE — 82043 UR ALBUMIN QUANTITATIVE: CPT | Performed by: INTERNAL MEDICINE

## 2025-03-31 PROCEDURE — 83036 HEMOGLOBIN GLYCOSYLATED A1C: CPT | Performed by: INTERNAL MEDICINE

## 2025-03-31 PROCEDURE — 1159F MED LIST DOCD IN RCRD: CPT | Performed by: INTERNAL MEDICINE

## 2025-03-31 PROCEDURE — 84443 ASSAY THYROID STIM HORMONE: CPT | Performed by: INTERNAL MEDICINE

## 2025-03-31 PROCEDURE — 80053 COMPREHEN METABOLIC PANEL: CPT | Performed by: INTERNAL MEDICINE

## 2025-03-31 PROCEDURE — 1160F RVW MEDS BY RX/DR IN RCRD: CPT | Performed by: INTERNAL MEDICINE

## 2025-03-31 PROCEDURE — 36415 COLL VENOUS BLD VENIPUNCTURE: CPT | Performed by: INTERNAL MEDICINE

## 2025-03-31 PROCEDURE — 82570 ASSAY OF URINE CREATININE: CPT | Performed by: INTERNAL MEDICINE

## 2025-03-31 PROCEDURE — 82947 ASSAY GLUCOSE BLOOD QUANT: CPT | Performed by: INTERNAL MEDICINE

## 2025-03-31 PROCEDURE — 99214 OFFICE O/P EST MOD 30 MIN: CPT | Performed by: INTERNAL MEDICINE

## 2025-03-31 PROCEDURE — 80061 LIPID PANEL: CPT | Performed by: INTERNAL MEDICINE

## 2025-03-31 PROCEDURE — 3074F SYST BP LT 130 MM HG: CPT | Performed by: INTERNAL MEDICINE

## 2025-03-31 RX ORDER — METFORMIN HYDROCHLORIDE 500 MG/5ML
10 SOLUTION ORAL 2 TIMES DAILY
Start: 2025-03-31

## 2025-03-31 NOTE — ASSESSMENT & PLAN NOTE
Diabetes is worsening. A1c increased but still acceptable.  Continue current treatment regimen.  Diabetes will be reassessed in 6 months.

## 2025-03-31 NOTE — PROGRESS NOTES
"     Office Note      Date: 2025  Patient Name: Martha Dunbar  MRN: [id number]  : 1940    Chief Complaint   Patient presents with    Diabetes     Type II    Hyperlipidemia     Mixed    Hypertension     Benign       History of Present Illness:   Martha Dunbar is a 85 y.o. female who presents for Diabetes type 2. Diagnosed in: . Treated in past with oral agents. Current treatments: glyxambi and metformin. Number of insulin shots per day: none. Checks blood sugar one time a week. Has low blood sugar: no. Aspirin use: Yes. Statin use: Yes. ACE-I/ARB use: Yes. Changes in health since last visit: none. Last eye exam 2024.     Subjective      Diabetic Complications:  Eyes: No  Kidneys: No  Feet: No  Heart: Yes - stent    Diet and Exercise:  Meals per day: 3  Minutes of exercise per week: 180 mins.    Review of Systems:   Review of Systems   Constitutional: Negative.    Cardiovascular: Negative.    Gastrointestinal: Negative.    Endocrine: Negative.        The following portions of the patient's history were reviewed and updated as appropriate: allergies, current medications, past family history, past medical history, past social history, past surgical history, and problem list.    Objective     Visit Vitals  /68 (BP Location: Left arm, Patient Position: Sitting, Cuff Size: Adult)   Pulse 54   Ht 160 cm (62.99\")   Wt 70.2 kg (154 lb 12.8 oz)   SpO2 96%   BMI 27.43 kg/m²       Physical Exam:  Physical Exam  Constitutional:       Appearance: Normal appearance.   Cardiovascular:      Pulses:           Dorsalis pedis pulses are 2+ on the right side and 2+ on the left side.        Posterior tibial pulses are 2+ on the right side and 2+ on the left side.   Feet:      Right foot:      Protective Sensation: 5 sites tested.  5 sites sensed.      Skin integrity: Dry skin present.      Toenail Condition: Right toenails are normal.      Left foot:      Protective Sensation: 5 sites tested.  5 sites sensed. " "     Skin integrity: Dry skin present.      Toenail Condition: Left toenails are normal.   Neurological:      Mental Status: She is alert.         Labs:    HbA1c  Lab Results   Component Value Date    HGBA1C 7.6 (A) 03/31/2025       CMP  Lab Results   Component Value Date    GLUCOSE 121 (H) 05/17/2022    BUN 14 07/25/2022    CREATININE 0.85 07/25/2022    EGFRIFNONA >60 07/25/2022    EGFRIFAFRI >60 07/25/2022    BCR 16 07/25/2022    K 4.4 07/25/2022    CO2 24 07/25/2022    CALCIUM 9.4 07/25/2022    AST 14 07/25/2022    ALT 19 07/25/2022        Lipid Panel  Lab Results   Component Value Date    HDL Cholesterol 46 (L) 07/25/2022    LDL Cholesterol  28.4 07/25/2022    Triglycerides 93 07/25/2022        TSH  No results found for: \"TSH\", \"FREET4\"     Hemoglobin A1C  No components found for: \"HGBA1C\"     Microalbumin/Creatinine  Lab Results   Component Value Date    MALBCRERATIO 71.5 01/24/2022    CREATINIURIN 64.9 12/22/2020    MICROALBUR <1.2 01/24/2024           Assessment / Plan      Assessment & Plan:  Diagnoses and all orders for this visit:    1. Type 2 diabetes mellitus with hyperglycemia, without long-term current use of insulin (Primary)  Assessment & Plan:  Diabetes is worsening. A1c increased but still acceptable.  Continue current treatment regimen.  Diabetes will be reassessed in 6 months.    Orders:  -     POC Glucose, Blood  -     POC Glycosylated Hemoglobin (Hb A1C)  -     Comprehensive Metabolic Panel; Future  -     Lipid Panel; Future  -     Microalbumin / Creatinine Urine Ratio - Urine, Clean Catch; Future  -     TSH; Future  -     Comprehensive Metabolic Panel  -     Lipid Panel  -     Microalbumin / Creatinine Urine Ratio - Urine, Clean Catch  -     TSH    2. Benign hypertension  Assessment & Plan:  Hypertension is stable and controlled.  Continue current treatment regimen.  Blood pressure will be reassessed in 6 months.      3. Mixed hyperlipidemia  Assessment & Plan:  Continue statin and " ezetimibe.  Check lipids today.      4. Atherosclerosis of native coronary artery of native heart without angina pectoris  Assessment & Plan:  Continue ASA, statin, ezetimibe and SLGT-2 inhibitor.      Other orders  -     metFORMIN HCl 500 MG/5ML solution; Take 10 mL by mouth 2 (Two) Times a Day.      Current Outpatient Medications   Medication Instructions    aspirin 81 mg, Daily    atorvastatin (LIPITOR) 40 MG tablet Daily    carvedilol (COREG) 6.25 mg, Oral, Daily    clobetasol (TEMOVATE) 0.05 % cream As Needed    Empagliflozin-linaGLIPtin (Glyxambi) 25-5 MG tablet 1 each, Oral, Daily    ezetimibe (ZETIA) 10 MG tablet Daily    fluocinolone (SYNALAR) 0.025 % cream Every 12 Hours Scheduled    lisinopril (PRINIVIL,ZESTRIL) 10 MG tablet Daily    metFORMIN HCl 500 MG/5ML solution 10 mL, Oral, 2 Times Daily    mupirocin (BACTROBAN) 2 % ointment 1 Application, Topical, 3 Times Daily      Return in about 6 months (around 9/30/2025) for Recheck with A1c.    Electronically signed by: Abel Lee MD  03/31/2025

## 2025-04-01 ENCOUNTER — RESULTS FOLLOW-UP (OUTPATIENT)
Dept: ENDOCRINOLOGY | Facility: CLINIC | Age: 85
End: 2025-04-01
Payer: MEDICARE

## 2025-04-01 LAB
ALBUMIN SERPL-MCNC: 3.8 G/DL (ref 3.5–5.2)
ALBUMIN UR-MCNC: <1.2 MG/DL
ALBUMIN/GLOB SERPL: 1.1 G/DL
ALP SERPL-CCNC: 106 U/L (ref 39–117)
ALT SERPL W P-5'-P-CCNC: 47 U/L (ref 1–33)
ANION GAP SERPL CALCULATED.3IONS-SCNC: 13.1 MMOL/L (ref 5–15)
AST SERPL-CCNC: 50 U/L (ref 1–32)
BILIRUB SERPL-MCNC: 0.3 MG/DL (ref 0–1.2)
BUN SERPL-MCNC: 13 MG/DL (ref 8–23)
BUN/CREAT SERPL: 14.3 (ref 7–25)
CALCIUM SPEC-SCNC: 9.8 MG/DL (ref 8.6–10.5)
CHLORIDE SERPL-SCNC: 105 MMOL/L (ref 98–107)
CHOLEST SERPL-MCNC: 94 MG/DL (ref 0–200)
CO2 SERPL-SCNC: 23.9 MMOL/L (ref 22–29)
CREAT SERPL-MCNC: 0.91 MG/DL (ref 0.57–1)
CREAT UR-MCNC: 58.6 MG/DL
EGFRCR SERPLBLD CKD-EPI 2021: 62 ML/MIN/1.73
GLOBULIN UR ELPH-MCNC: 3.4 GM/DL
GLUCOSE SERPL-MCNC: 139 MG/DL (ref 65–99)
HDLC SERPL-MCNC: 44 MG/DL (ref 40–60)
LDLC SERPL CALC-MCNC: 33 MG/DL (ref 0–100)
LDLC/HDLC SERPL: 0.76 {RATIO}
MICROALBUMIN/CREAT UR: NORMAL MG/G{CREAT}
POTASSIUM SERPL-SCNC: 4.5 MMOL/L (ref 3.5–5.2)
PROT SERPL-MCNC: 7.2 G/DL (ref 6–8.5)
SODIUM SERPL-SCNC: 142 MMOL/L (ref 136–145)
TRIGL SERPL-MCNC: 83 MG/DL (ref 0–150)
TSH SERPL DL<=0.05 MIU/L-ACNC: 1.67 UIU/ML (ref 0.27–4.2)
VLDLC SERPL-MCNC: 17 MG/DL (ref 5–40)